# Patient Record
Sex: MALE | Race: WHITE | NOT HISPANIC OR LATINO | Employment: FULL TIME | URBAN - METROPOLITAN AREA
[De-identification: names, ages, dates, MRNs, and addresses within clinical notes are randomized per-mention and may not be internally consistent; named-entity substitution may affect disease eponyms.]

---

## 2023-12-08 ENCOUNTER — CLINICAL SUPPORT (OUTPATIENT)
Dept: AUDIOLOGY | Facility: CLINIC | Age: 58
End: 2023-12-08
Payer: COMMERCIAL

## 2023-12-08 DIAGNOSIS — H90.3 SENSORINEURAL HEARING LOSS (SNHL) OF BOTH EARS: Primary | ICD-10-CM

## 2023-12-08 DIAGNOSIS — H93.13 TINNITUS OF BOTH EARS: ICD-10-CM

## 2023-12-08 NOTE — PROGRESS NOTES
HEARING AID CHECK    Name:  John Wagner  :  1965  Age:  58 y.o.    HEARING AID INFORMATION:    Right Hearing Aid  Oticon More 3  SN: [id number]  Warranty: 2025  Left Hearing Aid  Oticon more 3  SN: [id number]  Warranty: 2025   Length   Right: #2 85 gain  Left: #2 85 gain  Dome/Earmold  Right: []  Left: []  Wax Filter  pro wax mini fit  Battery Size  312, brown    TLC Expiration:  2024      HISTORY:    Patient seen for routine hearing aid check.  No reported concerns or complaints with the hearing aids.  He enjoys them when he wears them.  He admittedly feels he should wear them more often than he does.  Reassured patient that as long as he is doing well when he wears the hearing aids, I consider it success.  His work environment is not an appropriate place to wear his hearing aids due to exposures to the hearing aid itself as well as noise exposures.    EXAM/PROCEDURES:    Otoscopy revealed minimal non-occluding cerumen with normal appearing tympanic membranes, bilaterally.    Cleaned and checked.    Vacuumed kacie ports and  ports. Replaced wax filters and domes.   A drying cycle was completed in the Redux.  Results indicate less than 0.5 uL of liquid was removed from the devices during the 4: 45 minute cycle.    Listening check indicated hearing aids functioning appropriately with no distortion or internal feedback indicated.     Connected to hearing aid fitting software.  Completed firmware update as needed.  No further programming changes were made.      RECOMMENDATIONS AND FOLLOW-UP:    Recommended and scheduled 6-month follow-up HAC.  Patient to contact the office sooner if problems arise.      Sondra Carrillo  Clinical Audiologist

## 2024-06-07 ENCOUNTER — APPOINTMENT (OUTPATIENT)
Dept: AUDIOLOGY | Facility: CLINIC | Age: 59
End: 2024-06-07
Payer: COMMERCIAL

## 2024-06-13 ENCOUNTER — APPOINTMENT (OUTPATIENT)
Dept: AUDIOLOGY | Facility: CLINIC | Age: 59
End: 2024-06-13
Payer: COMMERCIAL

## 2024-06-13 DIAGNOSIS — H90.3 SENSORINEURAL HEARING LOSS (SNHL) OF BOTH EARS: Primary | ICD-10-CM

## 2024-06-13 DIAGNOSIS — H93.13 TINNITUS OF BOTH EARS: ICD-10-CM

## 2024-06-13 NOTE — PROGRESS NOTES
HEARING AID CHECK    Name:  John Wagner  :  1965  Age:  58 y.o.    HEARING AID INFORMATION:    Right Hearing Aid  Oticon More 3  SN: [id number]  Warranty: 2025  Left Hearing Aid  Oticon more 3  SN: [id number]  Warranty: 2025   Length   Right: 2(85)  Left: 2(85)  Dome/Earmold  Right: 8 mm open bass  Left: 8 mm open bass  Wax Filter  Prowax miniFit  Battery Size  312 / brown  TLC Expiration:  2024      HISTORY:    Patient was seen for 6-month check of the above devices.  He denied any concerns or complaints at this time.  He reported that they help significantly with his tinnitus, as it goes away as soon as he puts the hearing aids in.    EXAM/PROCEDURES:    Visual inspection of the devices revealed minimal cerumen accumulation.  Cleaned and checked hearing aids.  Vacuumed kacie ports and  ports. Cleaned battery contacts.  Completed Redux dehumidification treatment for patient's hearing aids where 1.0 uL of moisture was removed during a 8:29 minute cycle.  Replaced wax filters and domes. Final listening check indicated adequate sound quality and function with no distortion of internal feedback. Confirmed no firmware update was needed.      Dispensed domes, wax guards, and batteries.  Patient was informed that his TLC is expiring, and future appointments will incur charges for the visit and supplies.    RECOMMENDATIONS AND FOLLOW-UP:    - Continue use of amplification and follow-up as recommended for hearing aid maintenance and adjustments.  - Recommended 6-month follow-up HAC. Patient to contact the office sooner if problems arise.  - Monitor and recheck hearing as warranted.  - Counseled regarding results and recommendations.      Sondra Fermin  Clinical Audiologist

## 2024-12-14 ENCOUNTER — APPOINTMENT (OUTPATIENT)
Dept: AUDIOLOGY | Facility: CLINIC | Age: 59
End: 2024-12-14
Payer: COMMERCIAL